# Patient Record
Sex: FEMALE | URBAN - METROPOLITAN AREA
[De-identification: names, ages, dates, MRNs, and addresses within clinical notes are randomized per-mention and may not be internally consistent; named-entity substitution may affect disease eponyms.]

---

## 2017-01-23 ENCOUNTER — OFFICE VISIT (OUTPATIENT)
Dept: SURGERY | Age: 52
End: 2017-01-23

## 2017-01-23 VITALS
SYSTOLIC BLOOD PRESSURE: 131 MMHG | DIASTOLIC BLOOD PRESSURE: 84 MMHG | BODY MASS INDEX: 20.49 KG/M2 | HEART RATE: 74 BPM | OXYGEN SATURATION: 99 % | WEIGHT: 120 LBS | HEIGHT: 64 IN

## 2017-01-23 DIAGNOSIS — L08.9 INFECTED SEBACEOUS CYST: Primary | ICD-10-CM

## 2017-01-23 DIAGNOSIS — L72.3 INFECTED SEBACEOUS CYST: Primary | ICD-10-CM

## 2017-01-23 RX ORDER — CEPHALEXIN 500 MG/1
500 CAPSULE ORAL 4 TIMES DAILY
COMMUNITY

## 2017-01-23 RX ORDER — DOXYCYCLINE 100 MG/1
100 CAPSULE ORAL 2 TIMES DAILY
COMMUNITY

## 2017-01-23 NOTE — PROGRESS NOTES
To: Dr. Rosalia Curran    From: Prery Singleton MD    Thank you for sending Rosangela Cohen to see us. I enjoyed meeting her. Encounter Date: 1/23/2017  History and Physical    Assessment:   Infected sebaceous cyst on the right chest.    Inflamed but no fluctuance. Just started abx on Friday. She is very nervous about any procedure. Plan:   I presented her with 2 options -- I&D today followed by BID packing versus giving the abx a chance to clear the infection so we could avoid I&D and later do a one step excision. I told her that continuing abx and returning in a week, there would be about a 50% it would still need to be lanced, but a 50% chance that we could excise it and close the incision. She opted for the latter. Appt made for next Monday. She knows to call in the meantime for drainage, worsening redness or pain. Risks including bleeding and infection were explained to the patient. The patient expressed understanding of the risks, and all questions were answered to the patient's satisfaction. HPI:   Dodie Obrien is a 46 y.o. female who is seen in consultation at the request of Dr. Keven Head for evaluation of an inflamed cyst on the right chest.  She has had a small bump there for years but it became tender and red about a week ago. There has not been drainage. Saw Dr. Rosalia Curran Friday who started her on doxy and keflex. It has not gotten worse but does not seem any better either. Saw Dr. Rosalia Curran again today who referred her here. No f/c. No known h/o MRSA.       Past Medical History   Diagnosis Date    Burning with urination      blood in urin    Hypercholesterolemia      Past Surgical History   Procedure Laterality Date    Pr breast surgery procedure unlisted  1997     breast implants    Hx gyn  2009     ablation/polyp      Family History   Problem Relation Age of Onset    Hypertension Mother     Heart Disease Father     Hypertension Father     Stroke Father      Social History Substance Use Topics    Smoking status: Never Smoker    Smokeless tobacco: Not on file    Alcohol use No      Current Outpatient Prescriptions   Medication Sig    doxycycline (MONODOX) 100 mg capsule Take 100 mg by mouth two (2) times a day.  cephALEXin (KEFLEX) 500 mg capsule Take 500 mg by mouth four (4) times daily. No current facility-administered medications for this visit. Allergies:  No Known Allergies    Review of Systems:  10 systems reviewed. See scanned sheet in \"Media\" section. See HPI for pertinent positives and negatives. Objective:     Visit Vitals    /84    Pulse 74    Ht 5' 4\" (1.626 m)    Wt 54.4 kg (120 lb)    SpO2 99%    BMI 20.6 kg/m2       Physical Exam:  General appearance  Alert, cooperative, no distress, appears stated age           Chest Right upper chest 2cm mass TTP, min erythema, no fluctuance, +central pore. Lungs   Clear to auscultation bilaterally   Heart  Regular rate and rhythm. No murmur, rub or gallop   Abdomen   Soft, non-tender. Lymph nodes No palpable axillary or supraclavicular LAD.    Neurologic Without overt sensory or motor deficit         Signed By: Perfecto Nieves MD     January 23, 2017

## 2017-01-23 NOTE — MR AVS SNAPSHOT
Visit Information Date & Time Provider Department Dept. Phone Encounter #  
 1/23/2017 10:20 AM Lazaro Peoples MD [facility] [id number] Your Appointments 1/30/2017  3:00 PM  
OFFICE SURGERY with Lazaro Peoples MD  
[facility] [address] Ole Serrano [facility] ([facility]) Appt Note: excision chest cyst  
 [address], [address] [facility], [address] [phone]  
[address], [address] [facility], [address] [facility] [address] [phone] Upcoming Health Maintenance Date Due Hepatitis C Screening 1965 DTaP/Tdap/Td series (1 - Tdap) 4/11/1984 PAP AKA CERVICAL CYTOLOGY 4/11/1984 BREAST CANCER SCRN MAMMOGRAM 4/11/2013 FOBT Q 1 YEAR AGE 50-75 4/11/2013 INFLUENZA AGE 9 TO ADULT 8/1/2016 Allergies as of 1/23/2017  Review Complete On: 1/23/2017 By: Vannessa Nowak No Known Allergies Current Immunizations  Never Reviewed No immunizations on file. Not reviewed this visit Vitals BP Pulse Height(growth percentile) Weight(growth percentile) SpO2 BMI  
 131/84 74 5' 4\" (1.626 m) 120 lb (54.4 kg) 99% 20.6 kg/m2 Smoking Status Never Smoker BMI and BSA Data Body Mass Index Body Surface Area  
 20.6 kg/m 2 1.57 m 2 Your Updated Medication List  
  
   
This list is accurate as of: 1/23/17 11:25 AM.  Always use your most recent med list.  
  
  
  
  
 cephALEXin 500 mg capsule Commonly known as:  Maisha Fly Take 500 mg by mouth four (4) times daily. doxycycline 100 mg capsule Commonly known as:  Norris Ganong Take 100 mg by mouth two (2) times a day. Introducing [facility]! Micheal Flores introduces [employer] patient portal. Now you can access parts of your medical record, email your doctor's office, and request medication refills online.    
 
1. In your internet browser, go to [email or web address] 2. Click on the First Time User? Click Here link in the Sign In box. You will see the New Member Sign Up page. 3. Enter your [employer] Access Code exactly as it appears below. You will not need to use this code after youve completed the sign-up process. If you do not sign up before the expiration date, you must request a new code. · [employer] Access Code: [email or web address] Expires: 4/23/2017 10:09 AM 
 
4. Enter the last four digits of your Social Security Number (xxxx) and Date of Birth (mm/dd/yyyy) as indicated and click Submit. You will be taken to the next sign-up page. 5. Create a Akvot ID. This will be your [employer] login ID and cannot be changed, so think of one that is secure and easy to remember. 6. Create a [employer] password. You can change your password at any time. 7. Enter your Password Reset Question and Answer. This can be used at a later time if you forget your password. 8. Enter your e-mail address. You will receive e-mail notification when new information is available in [address] 9. Click Sign Up. You can now view and download portions of your medical record. 10. Click the Download Summary menu link to download a portable copy of your medical information. If you have questions, please visit the Frequently Asked Questions section of the [employer] website. Remember, [employer] is NOT to be used for urgent needs. For medical emergencies, dial 911. Now available from your iPhone and Android! Please provide this summary of care documentation to your next provider. If you have any questions after today's visit, please call [phone].

## 2017-02-07 ENCOUNTER — TELEPHONE (OUTPATIENT)
Dept: SURGERY | Age: 52
End: 2017-02-07

## 2017-02-07 NOTE — TELEPHONE ENCOUNTER
Called Mrs. Janie Simeon regarding why she cancelled office surgery for 1/30. for excision of chest cyst.  She stated she had a planned dermatology apt the Thursday before and they felt at that time it needed to be opened. Mrs. Janie Simeon thinks they got it all out, it was not sutured and she didn't pack. She was very grateful for call and she will call us if she needs us.